# Patient Record
Sex: FEMALE | Race: WHITE | NOT HISPANIC OR LATINO | Employment: UNEMPLOYED | URBAN - METROPOLITAN AREA
[De-identification: names, ages, dates, MRNs, and addresses within clinical notes are randomized per-mention and may not be internally consistent; named-entity substitution may affect disease eponyms.]

---

## 2019-02-27 PROBLEM — O26.899 VAGINAL DISCHARGE DURING PREGNANCY: Status: ACTIVE | Noted: 2019-02-27

## 2019-02-27 PROBLEM — N89.8 VAGINAL DISCHARGE DURING PREGNANCY: Status: ACTIVE | Noted: 2019-02-27

## 2019-02-28 PROBLEM — N89.8 VAGINAL DISCHARGE DURING PREGNANCY: Status: RESOLVED | Noted: 2019-02-27 | Resolved: 2019-02-28

## 2019-02-28 PROBLEM — O26.899 VAGINAL DISCHARGE DURING PREGNANCY: Status: RESOLVED | Noted: 2019-02-27 | Resolved: 2019-02-28

## 2019-05-07 ENCOUNTER — HOSPITAL ENCOUNTER (EMERGENCY)
Facility: HOSPITAL | Age: 24
Discharge: HOME | End: 2019-05-08
Attending: EMERGENCY MEDICINE
Payer: MEDICAID

## 2019-05-07 VITALS
TEMPERATURE: 99 F | OXYGEN SATURATION: 98 % | HEART RATE: 113 BPM | RESPIRATION RATE: 16 BRPM | SYSTOLIC BLOOD PRESSURE: 125 MMHG | WEIGHT: 158 LBS | DIASTOLIC BLOOD PRESSURE: 83 MMHG | HEIGHT: 62 IN | BODY MASS INDEX: 29.08 KG/M2

## 2019-05-07 DIAGNOSIS — S80.00XA CONTUSION OF KNEE, UNSPECIFIED LATERALITY, INITIAL ENCOUNTER: Primary | ICD-10-CM
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
## 2019-05-07 PROCEDURE — 99283 EMERGENCY DEPT VISIT LOW MDM: CPT | Mod: 25

## 2019-05-08 NOTE — DISCHARGE INSTRUCTIONS
Take tylenol and ibuprofen as needed. Follow RICE therapy. Follow up with primary care doctor and orthopedic as needed.

## 2019-05-08 NOTE — ED PROVIDER NOTES
Encounter Date: 5/7/2019       History     Chief Complaint   Patient presents with    Knee Pain     tripped over doggy gait. Injury to left  knee     05/07/2019  11:21 PM     Chief Complaint:     The patient is a 23 y.o. female who presents with c/o left knee pain after trip and fall this evening. Pt reports pain worse with weight bearing and has been using crutches which has helped. Denies PMHx. Allergic to tramadol            Review of patient's allergies indicates:   Allergen Reactions    Tramadol Anaphylaxis     Tachycardia, anxiety     Past Medical History:   Diagnosis Date    Asthma     Mental disorder     depression, anxiety     No past surgical history on file.  Family History   Problem Relation Age of Onset    Cancer Maternal Aunt     Cancer Maternal Grandmother     Heart disease Maternal Grandmother     Heart disease Maternal Grandfather     Birth defects Paternal Grandfather      Social History     Tobacco Use    Smoking status: Current Every Day Smoker     Packs/day: 0.50     Types: Cigarettes    Smokeless tobacco: Never Used   Substance Use Topics    Alcohol use: No     Frequency: Never    Drug use: No     Comment: history of marijuana     Review of Systems   Constitutional: Negative for activity change, appetite change, chills and fever.   HENT: Negative for facial swelling, trouble swallowing and voice change.    Eyes: Negative for visual disturbance.   Respiratory: Negative for shortness of breath.    Cardiovascular: Negative for chest pain and palpitations.   Gastrointestinal: Negative for nausea and vomiting.   Genitourinary: Negative for difficulty urinating and hematuria.   Musculoskeletal: Positive for arthralgias. Negative for neck pain and neck stiffness.   Skin: Negative for color change, pallor, rash and wound.   Neurological: Negative for syncope, weakness and numbness.   Hematological: Does not bruise/bleed easily.   Psychiatric/Behavioral: Negative for confusion.        Physical Exam     Initial Vitals [05/07/19 2149]   BP Pulse Resp Temp SpO2   125/83 (!) 113 16 98.5 °F (36.9 °C) 98 %      MAP       --         Physical Exam    Nursing note and vitals reviewed.  Constitutional: She appears well-developed and well-nourished. She is not diaphoretic. No distress.   HENT:   Head: Normocephalic and atraumatic.   Right Ear: External ear normal.   Left Ear: External ear normal.   Nose: Nose normal.   Eyes: Conjunctivae and EOM are normal. Right eye exhibits no discharge. Left eye exhibits no discharge.   Neck: Normal range of motion. Neck supple.   Cardiovascular: Normal rate, regular rhythm and normal heart sounds.   Pulmonary/Chest: Breath sounds normal. No respiratory distress.   Abdominal: Soft. Bowel sounds are normal. She exhibits no distension. There is no tenderness.   Musculoskeletal: Normal range of motion. She exhibits tenderness. She exhibits no edema.   Medial and lateral aspect of knee TTP, no obvious deformity or edema. No laxity. +2 pedal pulses. No decreased sensation or cap refill. 5/5 BLE strength    Lymphadenopathy:     She has no cervical adenopathy.   Neurological: She is alert and oriented to person, place, and time. She has normal strength. GCS score is 15. GCS eye subscore is 4. GCS verbal subscore is 5. GCS motor subscore is 6.   Skin: Skin is warm and dry. Capillary refill takes less than 2 seconds. No rash and no abscess noted. No erythema. No pallor.   Psychiatric: She has a normal mood and affect. Her behavior is normal. Thought content normal.         ED Course   Procedures  Labs Reviewed - No data to display       Imaging Results          X-Ray Knee 3 View Left (In process)  Result time 05/07/19 21:54:27                 Medical Decision Making:   History:   Old Medical Records: I decided to obtain old medical records.  Differential Diagnosis:   Fracture  Dislocation  Sprain  Contusion  Strain  Spasm      Clinical Tests:   Radiological Study: Ordered  and Reviewed       APC / Resident Notes:   The patient appears to have a knee sprain and contusion.  The patient's xrays show no signs of fracture, dislocation, or subluxation.  The patient could have a ligamentous injury, but the knee doesn't appear to be unstable.  The patient will be discharged home to follow up with their physician or the doctor provided.  They will be treated with supportive care. I have discussed pt with Dr Cornell who reviewed xrays and agrees with POC. Pt  voices understanding and is agreeable to the plan.  She is given specific return precautions.                      Clinical Impression:       ICD-10-CM ICD-9-CM   1. Contusion of knee, unspecified laterality, initial encounter S80.00XA 924.11   2. Fall W19.XXXA E888.9         Disposition:   Disposition: Discharged  Condition: Stable                        Safia Sultana NP  05/08/19 0102

## 2019-11-24 PROBLEM — O47.9 IRREGULAR CONTRACTIONS: Status: ACTIVE | Noted: 2019-11-24

## 2019-12-31 PROBLEM — N89.8 VAGINAL DISCHARGE DURING PREGNANCY IN THIRD TRIMESTER: Status: ACTIVE | Noted: 2019-12-31

## 2019-12-31 PROBLEM — O26.893 VAGINAL DISCHARGE DURING PREGNANCY IN THIRD TRIMESTER: Status: ACTIVE | Noted: 2019-12-31

## 2020-01-08 PROBLEM — O26.899 PELVIC PRESSURE IN PREGNANCY: Status: ACTIVE | Noted: 2020-01-08

## 2020-01-08 PROBLEM — R10.2 PELVIC PRESSURE IN PREGNANCY: Status: ACTIVE | Noted: 2020-01-08

## 2020-01-09 PROBLEM — O47.9 UTERINE CONTRACTIONS DURING PREGNANCY: Status: ACTIVE | Noted: 2020-01-09

## 2020-01-09 PROBLEM — Z34.90 TERM PREGNANCY: Status: ACTIVE | Noted: 2020-01-09

## 2020-01-09 PROBLEM — Z37.9 NORMAL LABOR: Status: ACTIVE | Noted: 2020-01-09

## 2020-04-13 PROBLEM — Z37.9 NORMAL LABOR: Status: RESOLVED | Noted: 2020-01-09 | Resolved: 2020-04-13
